# Patient Record
Sex: FEMALE | Race: WHITE | NOT HISPANIC OR LATINO
[De-identification: names, ages, dates, MRNs, and addresses within clinical notes are randomized per-mention and may not be internally consistent; named-entity substitution may affect disease eponyms.]

---

## 2020-02-28 ENCOUNTER — RX ONLY (RX ONLY)
Age: 45
End: 2020-02-28

## 2020-02-28 ENCOUNTER — ACNE/ROSACEA (OUTPATIENT)
Dept: URBAN - METROPOLITAN AREA CLINIC 36 | Facility: CLINIC | Age: 45
Setting detail: DERMATOLOGY
End: 2020-02-28

## 2020-02-28 DIAGNOSIS — Q82.5 CONGENITAL NON-NEOPLASTIC NEVUS: ICD-10-CM

## 2020-02-28 PROBLEM — L71.8 OTHER ROSACEA: Status: ACTIVE | Noted: 2020-02-28

## 2020-02-28 PROBLEM — L71.8 OTHER ROSACEA: Status: RESOLVED | Noted: 2020-02-28

## 2020-02-28 PROCEDURE — 99202 OFFICE O/P NEW SF 15 MIN: CPT

## 2020-03-05 ENCOUNTER — RX ONLY (RX ONLY)
Age: 45
End: 2020-03-05

## 2020-03-05 RX ORDER — SODIUM SULFACETAMIDE AND SULFUR 80; 40 MG/ML; MG/ML
1 APPLICATION LOTION TOPICAL DAILY
Qty: 473 | Refills: 0
Start: 2020-03-05

## 2020-03-05 RX ORDER — AZELAIC ACID 0.15 G/G
1 APPLICATION GEL TOPICAL DAILY
Qty: 50 | Refills: 3
Start: 2020-03-05

## 2020-04-23 ENCOUNTER — TELEHEALTH (OUTPATIENT)
Dept: URBAN - METROPOLITAN AREA CLINIC 35 | Facility: CLINIC | Age: 45
Setting detail: DERMATOLOGY
End: 2020-04-23

## 2020-04-23 ENCOUNTER — RX ONLY (RX ONLY)
Age: 45
End: 2020-04-23

## 2020-04-23 DIAGNOSIS — D22.5 MELANOCYTIC NEVI OF TRUNK: ICD-10-CM

## 2020-04-23 DIAGNOSIS — L56.8 OTHER SPECIFIED ACUTE SKIN CHANGES DUE TO ULTRAVIOLET RADIATION: ICD-10-CM

## 2020-04-23 PROBLEM — L71.8 OTHER ROSACEA: Status: RESOLVED | Noted: 2020-04-23

## 2020-04-23 PROBLEM — L71.8 OTHER ROSACEA: Status: ACTIVE | Noted: 2020-04-23

## 2020-04-23 PROCEDURE — 99213 OFFICE O/P EST LOW 20 MIN: CPT

## 2020-04-23 RX ORDER — OXYMETAZOLINE HYDROCHLORIDE 1 G/100G
1 APPLICATION CREAM TOPICAL IN A.M.
Qty: 30 | Refills: 2
Start: 2020-04-23

## 2020-04-23 RX ORDER — DOXYCYCLINE HYCLATE 100 MG/1
1 CAPSULE CAPSULE, GELATIN COATED ORAL DAILY
Qty: 90 | Refills: 0
Start: 2020-04-23

## 2020-06-12 ENCOUNTER — VBEAM/KTP-MT (OUTPATIENT)
Dept: URBAN - METROPOLITAN AREA CLINIC 36 | Facility: CLINIC | Age: 45
Setting detail: DERMATOLOGY
End: 2020-06-12

## 2020-06-12 DIAGNOSIS — L70.0 ACNE VULGARIS: ICD-10-CM

## 2020-06-12 PROCEDURE — VBEAM VBEAM LASER TREATMENT: HCPCS

## 2020-07-17 ENCOUNTER — VBEAM/KTP-BH (OUTPATIENT)
Dept: URBAN - METROPOLITAN AREA CLINIC 31 | Facility: CLINIC | Age: 45
Setting detail: DERMATOLOGY
End: 2020-07-17

## 2020-07-17 DIAGNOSIS — L73.8 OTHER SPECIFIED FOLLICULAR DISORDERS: ICD-10-CM

## 2020-07-17 PROCEDURE — VBEAM VBEAM LASER TREATMENT: HCPCS

## 2020-08-21 ENCOUNTER — ACNE (OUTPATIENT)
Dept: URBAN - METROPOLITAN AREA CLINIC 33 | Facility: CLINIC | Age: 45
Setting detail: DERMATOLOGY
End: 2020-08-21

## 2020-08-21 ENCOUNTER — RX ONLY (RX ONLY)
Age: 45
End: 2020-08-21

## 2020-08-21 DIAGNOSIS — D48.5 NEOPLASM OF UNCERTAIN BEHAVIOR OF SKIN: ICD-10-CM

## 2020-08-21 PROBLEM — L71.8 OTHER ROSACEA: Status: ACTIVE | Noted: 2020-08-21

## 2020-08-21 PROBLEM — L71.8 OTHER ROSACEA: Status: RESOLVED | Noted: 2020-08-21

## 2020-08-21 PROCEDURE — 99213 OFFICE O/P EST LOW 20 MIN: CPT

## 2020-08-21 RX ORDER — SPIRONOLACTONE 50 MG/1
1 TABLET TABLET, FILM COATED ORAL DAILY
Qty: 90 | Refills: 0
Start: 2020-08-21

## 2020-08-21 RX ORDER — DOXYCYCLINE HYCLATE 100 MG/1
1 CAPSULE CAPSULE, GELATIN COATED ORAL DAILY
Qty: 60 | Refills: 0
Start: 2020-08-21

## 2020-09-15 ENCOUNTER — VBEAM/KTP-EC (OUTPATIENT)
Dept: URBAN - METROPOLITAN AREA CLINIC 32 | Facility: CLINIC | Age: 45
Setting detail: DERMATOLOGY
End: 2020-09-15

## 2020-09-15 DIAGNOSIS — L20.84 INTRINSIC (ALLERGIC) ECZEMA: ICD-10-CM

## 2020-09-15 PROCEDURE — OTHER VBEAM LASER TREATMENT - NO CHARGE: OTHER

## 2020-10-26 ENCOUNTER — RX ONLY (RX ONLY)
Age: 45
End: 2020-10-26

## 2020-10-26 ENCOUNTER — ACNE/ROSACEA (OUTPATIENT)
Dept: URBAN - METROPOLITAN AREA CLINIC 31 | Facility: CLINIC | Age: 45
Setting detail: DERMATOLOGY
End: 2020-10-26

## 2020-10-26 DIAGNOSIS — D22.5 MELANOCYTIC NEVI OF TRUNK: ICD-10-CM

## 2020-10-26 PROCEDURE — 99213 OFFICE O/P EST LOW 20 MIN: CPT

## 2020-10-26 RX ORDER — SPIRONOLACTONE 50 MG/1
1 TABLET TABLET, FILM COATED ORAL BID
Qty: 60 | Refills: 2
Start: 2020-10-26

## 2020-10-26 RX ORDER — DOXYCYCLINE HYCLATE 50 MG/1
1 CAPSULE CAPSULE ORAL DAILY
Qty: 30 | Refills: 2 | Status: DISCONTINUED
Start: 2020-10-26 | End: 2021-01-06

## 2020-10-30 ENCOUNTER — VBEAM/KTP-MT (OUTPATIENT)
Dept: URBAN - METROPOLITAN AREA CLINIC 36 | Facility: CLINIC | Age: 45
Setting detail: DERMATOLOGY
End: 2020-10-30

## 2020-10-30 DIAGNOSIS — L57.0 ACTINIC KERATOSIS: ICD-10-CM

## 2020-10-30 PROCEDURE — OTHER COSMETIC TREATMENT: OTHER

## 2020-12-08 ENCOUNTER — RX ONLY (RX ONLY)
Age: 45
End: 2020-12-08

## 2020-12-08 RX ORDER — SPIRONOLACTONE 50 MG/1
1 TABLET TABLET, FILM COATED ORAL BID
Qty: 180 | Refills: 0
Start: 2020-12-08

## 2021-01-06 ENCOUNTER — RX ONLY (RX ONLY)
Age: 46
End: 2021-01-06

## 2021-01-06 RX ORDER — DOXYCYCLINE HYCLATE 50 MG/1
1 CAPSULE CAPSULE ORAL DAILY
Qty: 30 | Refills: 2
Start: 2021-01-06

## 2021-03-12 ENCOUNTER — VBEAM/KTP-MT (OUTPATIENT)
Dept: URBAN - METROPOLITAN AREA CLINIC 36 | Facility: CLINIC | Age: 46
Setting detail: DERMATOLOGY
End: 2021-03-12

## 2021-03-12 DIAGNOSIS — L72.3 SEBACEOUS CYST: ICD-10-CM

## 2021-03-12 PROCEDURE — OTHER COSMETIC TREATMENT: OTHER

## 2021-06-09 ENCOUNTER — SEE NOTE (OUTPATIENT)
Dept: URBAN - METROPOLITAN AREA CLINIC 36 | Facility: CLINIC | Age: 46
Setting detail: DERMATOLOGY
End: 2021-06-09

## 2021-06-09 DIAGNOSIS — L23.9 ALLERGIC CONTACT DERMATITIS, UNSPECIFIED CAUSE: ICD-10-CM

## 2021-06-09 PROBLEM — L60.9 NAIL DISORDER, UNSPECIFIED: Status: RESOLVED | Noted: 2021-06-09

## 2021-06-09 PROBLEM — L60.9 NAIL DISORDER, UNSPECIFIED: Status: ACTIVE | Noted: 2021-06-09

## 2021-06-09 PROCEDURE — 99213 OFFICE O/P EST LOW 20 MIN: CPT

## 2021-09-01 ENCOUNTER — FOLLOW UP (OUTPATIENT)
Dept: URBAN - METROPOLITAN AREA CLINIC 36 | Facility: CLINIC | Age: 46
Setting detail: DERMATOLOGY
End: 2021-09-01

## 2021-09-01 ENCOUNTER — RX ONLY (RX ONLY)
Age: 46
End: 2021-09-01

## 2021-09-01 DIAGNOSIS — B07.8 OTHER VIRAL WARTS: ICD-10-CM

## 2021-09-01 PROBLEM — L71.8 OTHER ROSACEA: Status: RESOLVED | Noted: 2021-09-01

## 2021-09-01 PROBLEM — L71.8 OTHER ROSACEA: Status: ACTIVE | Noted: 2021-09-01

## 2021-09-01 PROCEDURE — 99214 OFFICE O/P EST MOD 30 MIN: CPT

## 2021-09-01 RX ORDER — DOXYCYCLINE 100 MG/1
1 TABLET TABLET, FILM COATED ORAL TWICE A DAY
Qty: 28 | Refills: 0
Start: 2021-09-01

## 2021-09-01 RX ORDER — DOXYCYCLINE 40 MG/1
1 CAPSULE CAPSULE ORAL ONCE A DAY
Qty: 30 | Refills: 11
Start: 2021-09-01

## 2022-01-21 ENCOUNTER — RX ONLY (RX ONLY)
Age: 47
End: 2022-01-21

## 2022-01-21 ENCOUNTER — VBEAM BRUISING-MT (OUTPATIENT)
Dept: URBAN - METROPOLITAN AREA CLINIC 36 | Facility: CLINIC | Age: 47
Setting detail: DERMATOLOGY
End: 2022-01-21

## 2022-01-21 DIAGNOSIS — L73.8 OTHER SPECIFIED FOLLICULAR DISORDERS: ICD-10-CM

## 2022-01-21 PROBLEM — L71.8 OTHER ROSACEA: Status: RESOLVED | Noted: 2022-01-21

## 2022-01-21 PROBLEM — L60.9 NAIL DISORDER, UNSPECIFIED: Status: RESOLVED | Noted: 2022-01-21

## 2022-01-21 PROBLEM — L71.8 OTHER ROSACEA: Status: ACTIVE | Noted: 2022-01-21

## 2022-01-21 PROBLEM — L60.9 NAIL DISORDER, UNSPECIFIED: Status: ACTIVE | Noted: 2022-01-21

## 2022-01-21 PROCEDURE — VBEAM VBEAM LASER TREATMENT: HCPCS

## 2022-01-21 PROCEDURE — 99214 OFFICE O/P EST MOD 30 MIN: CPT

## 2022-02-10 ENCOUNTER — CYST INJECTION (OUTPATIENT)
Dept: URBAN - METROPOLITAN AREA CLINIC 36 | Facility: CLINIC | Age: 47
Setting detail: DERMATOLOGY
End: 2022-02-10

## 2022-02-10 DIAGNOSIS — L57.0 ACTINIC KERATOSIS: ICD-10-CM

## 2022-02-10 PROCEDURE — 11900 INJECT SKIN LESIONS </W 7: CPT

## 2022-02-11 ENCOUNTER — SEE NOTE (OUTPATIENT)
Dept: URBAN - METROPOLITAN AREA CLINIC 36 | Facility: CLINIC | Age: 47
Setting detail: DERMATOLOGY
End: 2022-02-11

## 2022-02-11 ENCOUNTER — RX ONLY (RX ONLY)
Age: 47
End: 2022-02-11

## 2022-02-11 DIAGNOSIS — L57.0 ACTINIC KERATOSIS: ICD-10-CM

## 2022-02-11 PROBLEM — L71.8 OTHER ROSACEA: Status: RESOLVED | Noted: 2022-02-11

## 2022-02-11 PROBLEM — L71.8 OTHER ROSACEA: Status: ACTIVE | Noted: 2022-02-11

## 2022-02-11 PROCEDURE — 99214 OFFICE O/P EST MOD 30 MIN: CPT

## 2022-03-30 ENCOUNTER — VBEAM/KTP-BH (OUTPATIENT)
Dept: URBAN - METROPOLITAN AREA CLINIC 31 | Facility: CLINIC | Age: 47
Setting detail: DERMATOLOGY
End: 2022-03-30

## 2022-03-30 DIAGNOSIS — L82.0 INFLAMED SEBORRHEIC KERATOSIS: ICD-10-CM

## 2022-03-30 PROBLEM — L604 703.8: Status: ACTIVE | Noted: 2022-03-30

## 2022-03-30 PROBLEM — L602 703.8: Status: ACTIVE | Noted: 2022-03-30

## 2022-03-30 PROBLEM — L601 703.8: Status: ACTIVE | Noted: 2022-03-30

## 2022-03-30 PROBLEM — L608 703.8: Status: ACTIVE | Noted: 2022-03-30

## 2022-03-30 PROBLEM — L603 703.8: Status: ACTIVE | Noted: 2022-03-30

## 2022-03-30 PROCEDURE — 99213 OFFICE O/P EST LOW 20 MIN: CPT

## 2022-03-30 PROCEDURE — VBEAM VBEAM LASER TREATMENT: HCPCS

## 2022-05-31 ENCOUNTER — VBEAM/KTP-EC (OUTPATIENT)
Dept: URBAN - METROPOLITAN AREA CLINIC 32 | Facility: CLINIC | Age: 47
Setting detail: DERMATOLOGY
End: 2022-05-31

## 2022-05-31 DIAGNOSIS — R68.89 OTHER GENERAL SYMPTOMS AND SIGNS: ICD-10-CM

## 2022-05-31 PROBLEM — L60.9 NAIL DISORDER, UNSPECIFIED: Status: ACTIVE | Noted: 2022-05-31

## 2022-05-31 PROBLEM — L60.9 NAIL DISORDER, UNSPECIFIED: Status: RESOLVED | Noted: 2022-05-31

## 2022-05-31 PROCEDURE — VBEAM VBEAM LASER TREATMENT: HCPCS

## 2022-06-30 ENCOUNTER — RX ONLY (RX ONLY)
Age: 47
End: 2022-06-30

## 2022-06-30 RX ORDER — EFINACONAZOLE 100 MG/ML
LIBERALLY SOLUTION TOPICAL EVERY NIGHT
Qty: 4 | Refills: 11
Start: 2022-06-30

## 2022-07-06 ENCOUNTER — RX ONLY (RX ONLY)
Age: 47
End: 2022-07-06

## 2022-07-06 RX ORDER — TERBINAFINE HYDROCHLORIDE 250 MG/1
1 TABLET TABLET ORAL ONCE A DAY
Qty: 90 | Refills: 0
Start: 2022-07-06

## 2022-07-27 ENCOUNTER — NAIL CHANGES (OUTPATIENT)
Dept: URBAN - METROPOLITAN AREA CLINIC 31 | Facility: CLINIC | Age: 47
Setting detail: DERMATOLOGY
End: 2022-07-27

## 2022-07-27 DIAGNOSIS — L72.3 SEBACEOUS CYST: ICD-10-CM

## 2022-07-27 PROBLEM — B35.1 TINEA UNGUIUM: Status: RESOLVED | Noted: 2022-07-27

## 2022-07-27 PROBLEM — B35.1 TINEA UNGUIUM: Status: ACTIVE | Noted: 2022-07-27

## 2022-07-27 PROCEDURE — 99214 OFFICE O/P EST MOD 30 MIN: CPT

## 2022-08-15 ENCOUNTER — APPOINTMENT (OUTPATIENT)
Dept: URBAN - METROPOLITAN AREA CLINIC 206 | Age: 47
Setting detail: DERMATOLOGY
End: 2022-08-21

## 2022-08-15 DIAGNOSIS — Z79.899 OTHER LONG TERM (CURRENT) DRUG THERAPY: ICD-10-CM

## 2022-08-15 PROCEDURE — OTHER ORDER TESTS: OTHER

## 2022-08-15 PROCEDURE — OTHER HIGH RISK MEDICATION MONITORING: OTHER

## 2022-08-15 PROCEDURE — OTHER COUNSELING: OTHER

## 2022-08-15 NOTE — PROCEDURE: HIGH RISK MEDICATION MONITORING
Xelnamz Pregnancy And Lactation Text: This medication is Pregnancy Category D and is not considered safe during pregnancy.  The risk during breast feeding is also uncertain.

## 2022-08-15 NOTE — PROCEDURE: ORDER TESTS
Billing Type: Third-Party Bill
Expected Date Of Service: 08/15/2022
Performing Laboratory: [id number]
Bill For Surgical Tray: no

## 2022-08-30 ENCOUNTER — APPOINTMENT (OUTPATIENT)
Dept: URBAN - METROPOLITAN AREA CLINIC 206 | Age: 47
Setting detail: DERMATOLOGY
End: 2022-09-01

## 2022-08-30 DIAGNOSIS — L71.8 OTHER ROSACEA: ICD-10-CM

## 2022-08-30 PROBLEM — R68.89 OTHER GENERAL SYMPTOMS AND SIGNS: Status: ACTIVE | Noted: 2022-08-30

## 2022-08-30 PROCEDURE — OTHER OTHER: OTHER

## 2022-08-30 PROCEDURE — OTHER ORDER TESTS: OTHER

## 2022-08-30 PROCEDURE — OTHER COUNSELING: OTHER

## 2022-08-30 PROCEDURE — OTHER PATIENT SPECIFIC COUNSELING: OTHER

## 2022-08-30 PROCEDURE — OTHER PRESCRIPTION: OTHER

## 2022-08-30 PROCEDURE — 99214 OFFICE O/P EST MOD 30 MIN: CPT

## 2022-08-30 RX ORDER — DOXYCYCLINE HYCLATE 20 MG/1
TABLET, FILM COATED ORAL
Qty: 60 | Refills: 0 | Status: ERX | COMMUNITY
Start: 2022-08-30

## 2022-08-30 RX ORDER — IVERMECTIN 10 MG/G
CREAM TOPICAL
Qty: 45 | Refills: 3 | Status: ERX | COMMUNITY
Start: 2022-08-30

## 2022-08-30 ASSESSMENT — LOCATION SIMPLE DESCRIPTION DERM
LOCATION SIMPLE: LEFT CHEEK
LOCATION SIMPLE: RIGHT CHEEK

## 2022-08-30 ASSESSMENT — LOCATION ZONE DERM: LOCATION ZONE: FACE

## 2022-08-30 ASSESSMENT — LOCATION DETAILED DESCRIPTION DERM
LOCATION DETAILED: RIGHT INFERIOR CENTRAL MALAR CHEEK
LOCATION DETAILED: LEFT INFERIOR CENTRAL MALAR CHEEK

## 2022-08-30 NOTE — PROCEDURE: PATIENT SPECIFIC COUNSELING
Patient to wait to treat with vbeam until pustules resolved from current rosacea flare today.
Detail Level: Zone

## 2022-08-30 NOTE — PROCEDURE: OTHER
Render Risk Assessment In Note?: no
Other (Free Text): Lab req given for when patient completes lamisil. Reviewed with pt that both labs (baseline and 6 week) contained lfts which were wnl. No need to redraw until course complete then follow up with Monik FRANCIS as instructed.
Note Text (......Xxx Chief Complaint.): This diagnosis correlates with the
Detail Level: Zone

## 2022-10-05 ENCOUNTER — APPOINTMENT (OUTPATIENT)
Dept: URBAN - METROPOLITAN AREA CLINIC 206 | Age: 47
Setting detail: DERMATOLOGY
End: 2022-10-05

## 2022-10-05 DIAGNOSIS — Z41.9 ENCOUNTER FOR PROCEDURE FOR PURPOSES OTHER THAN REMEDYING HEALTH STATE, UNSPECIFIED: ICD-10-CM

## 2022-10-05 PROCEDURE — OTHER VBEAM PERFECTA LASER: OTHER

## 2022-10-05 ASSESSMENT — LOCATION ZONE DERM
LOCATION ZONE: FACE
LOCATION ZONE: NOSE

## 2022-10-05 ASSESSMENT — LOCATION SIMPLE DESCRIPTION DERM
LOCATION SIMPLE: LEFT CHEEK
LOCATION SIMPLE: RIGHT CHEEK
LOCATION SIMPLE: CHIN
LOCATION SIMPLE: NOSE

## 2022-10-05 ASSESSMENT — LOCATION DETAILED DESCRIPTION DERM
LOCATION DETAILED: NASAL SUPRATIP
LOCATION DETAILED: LEFT INFERIOR CENTRAL MALAR CHEEK
LOCATION DETAILED: RIGHT INFERIOR CENTRAL MALAR CHEEK
LOCATION DETAILED: RIGHT CHIN

## 2022-10-05 NOTE — PROCEDURE: VBEAM PERFECTA LASER
Spray Time (Ms): 0
Post-Procedure: Following the procedure the post care instructions were reviewed with the patient.
Pulse Duration: 10 ms
Is There A Third Setting?: no
Spot Size: 3 mm
Number Of Pulses: 157
Consent: Written consent obtained.  Risks were reviewed including but not limited to crusting, scabbing, blistering, scarring, darker or lighter pigmentary change, bruising, and/or incomplete response.
Detail Level: Simple
Price (Use Numbers Only, No Special Characters Or $): 150
Post-Care Instructions: • Use gentle cleansers such as Cetphil or Cerave for 10 days after treatment\\n• If crusting occurs, do not shave or pick area. Apply Aquaphor ointment to wound area 2-3 times a day. Keep the area moist, and let the scab fall off on its own. No swimming or using hot tubs/whirlpools until the wound heals.\\n• Do not pick – this may cause infection and/or scarring\\n• When treatment area is exposed to the sun use a 30+ SPF sunblock with zinc oxide and reapply every 2 hours for first 2-3 weeks. \\n• We recommend daily SPF 30+ and wide-brimmed hat for 3-6 months\\n• You may resume products such as tretinoin/Retin-A, Retinol, glycolic acids, and alpha hydroxyl acid products 1 week after your last treatment\\n• You may resume Hydroquinone or bleaching products 2 weeks after your treatment\\n• Contact physician if there is any indication of blistering or infection (redness, tenderness or pus)\\n• Can expect redness & swelling for 24hrs; You may use ice packs if needed \\n• You may experience bruising for 5 – 10 days\\n• May use OTC Allegra to minimize swelling
Treatment Number: 9
Fluence (J/Cm2): 6.5
Spot Size: 10 mm
Pre-Procedure: The treatment areas identified by the patient were cleansed and treatment was performed with the parameters mentioned above. Proper eye protection was worn during procedure. Patient aware that multiple treatment may be needed for complete clearance.

## 2022-10-21 ENCOUNTER — RX ONLY (RX ONLY)
Age: 47
End: 2022-10-21

## 2022-10-21 RX ORDER — DOXYCYCLINE HYCLATE 20 MG/1
TABLET, FILM COATED ORAL
Qty: 60 | Refills: 0 | Status: ERX

## 2022-11-29 ENCOUNTER — RX ONLY (RX ONLY)
Age: 47
End: 2022-11-29

## 2022-11-29 RX ORDER — DOXYCYCLINE HYCLATE 20 MG/1
TABLET, FILM COATED ORAL
Qty: 180 | Refills: 1 | Status: ERX

## 2023-02-07 ENCOUNTER — APPOINTMENT (OUTPATIENT)
Dept: URBAN - METROPOLITAN AREA CLINIC 208 | Age: 48
Setting detail: DERMATOLOGY
End: 2023-02-09

## 2023-02-07 DIAGNOSIS — L71.8 OTHER ROSACEA: ICD-10-CM

## 2023-02-07 DIAGNOSIS — B35.1 TINEA UNGUIUM: ICD-10-CM

## 2023-02-07 DIAGNOSIS — L82.1 OTHER SEBORRHEIC KERATOSIS: ICD-10-CM

## 2023-02-07 PROCEDURE — OTHER COUNSELING: OTHER

## 2023-02-07 PROCEDURE — 99214 OFFICE O/P EST MOD 30 MIN: CPT

## 2023-02-07 PROCEDURE — OTHER PRESCRIPTION: OTHER

## 2023-02-07 PROCEDURE — OTHER PRESCRIPTION MEDICATION MANAGEMENT: OTHER

## 2023-02-07 PROCEDURE — OTHER OTHER: OTHER

## 2023-02-07 RX ORDER — DOXYCYCLINE HYCLATE 20 MG/1
TABLET, FILM COATED ORAL
Qty: 180 | Refills: 3 | Status: ERX

## 2023-02-07 RX ORDER — DOXYCYCLINE HYCLATE 100 MG/1
TABLET, COATED ORAL QD
Qty: 30 | Refills: 2 | Status: ERX | COMMUNITY
Start: 2023-02-07

## 2023-02-07 ASSESSMENT — LOCATION SIMPLE DESCRIPTION DERM
LOCATION SIMPLE: LOWER BACK
LOCATION SIMPLE: LEFT UPPER BACK
LOCATION SIMPLE: LEFT CHEEK
LOCATION SIMPLE: RIGHT CHEEK
LOCATION SIMPLE: LEFT GREAT TOE
LOCATION SIMPLE: NOSE

## 2023-02-07 ASSESSMENT — LOCATION DETAILED DESCRIPTION DERM
LOCATION DETAILED: LEFT GREAT TOENAIL
LOCATION DETAILED: LEFT CENTRAL MALAR CHEEK
LOCATION DETAILED: RIGHT CENTRAL MALAR CHEEK
LOCATION DETAILED: NASAL SUPRATIP
LOCATION DETAILED: LEFT SUPERIOR MEDIAL UPPER BACK
LOCATION DETAILED: SUPERIOR LUMBAR SPINE

## 2023-02-07 ASSESSMENT — LOCATION ZONE DERM
LOCATION ZONE: FACE
LOCATION ZONE: TOENAIL
LOCATION ZONE: NOSE
LOCATION ZONE: TRUNK

## 2023-02-07 NOTE — PROCEDURE: OTHER
Note Text (......Xxx Chief Complaint.): This diagnosis correlates with the
Render Risk Assessment In Note?: no
Detail Level: Zone
Other (Free Text): Continue to let nail grow out. \\nadvised patient there might be nail dystrophy when nail grows out.

## 2023-02-07 NOTE — PROCEDURE: PRESCRIPTION MEDICATION MANAGEMENT
Continue Regimen: Metronidazole on face in the morning\\nAzaleic acid on face at night
Render In Strict Bullet Format?: Yes
Modify Regimen: Restart Doxycycline 100 once a day 2-4 weeks then decrease to Doxy 20mg twice a day. If bothersome, switch to Minocycline foam daily.
Detail Level: Simple

## 2023-02-07 NOTE — HPI: INFECTION (ONYCHOMYCOSIS)
How Severe Is It?: mild
Is This A New Presentation, Or A Follow-Up?: Follow Up Onychomycosis
What Type Of Note Output Would You Prefer (Optional)?: Bullet Format

## 2023-03-15 NOTE — PROCEDURE: HIGH RISK MEDICATION MONITORING
Topical Clindamycin Pregnancy And Lactation Text: This medication is Pregnancy Category B and is considered safe during pregnancy. It is unknown if it is excreted in breast milk. Otezla Pregnancy And Lactation Text: This medication is Pregnancy Category C and it isn't known if it is safe during pregnancy. It is unknown if it is excreted in breast milk.

## 2023-04-19 ENCOUNTER — APPOINTMENT (OUTPATIENT)
Dept: URBAN - METROPOLITAN AREA CLINIC 206 | Age: 48
Setting detail: DERMATOLOGY
End: 2023-04-21

## 2023-04-19 DIAGNOSIS — Z41.9 ENCOUNTER FOR PROCEDURE FOR PURPOSES OTHER THAN REMEDYING HEALTH STATE, UNSPECIFIED: ICD-10-CM

## 2023-04-19 PROCEDURE — OTHER VBEAM PERFECTA LASER: OTHER

## 2023-04-19 ASSESSMENT — LOCATION DETAILED DESCRIPTION DERM
LOCATION DETAILED: LEFT INFERIOR CENTRAL MALAR CHEEK
LOCATION DETAILED: RIGHT CHIN
LOCATION DETAILED: RIGHT INFERIOR CENTRAL MALAR CHEEK
LOCATION DETAILED: NASAL SUPRATIP

## 2023-04-19 ASSESSMENT — LOCATION ZONE DERM
LOCATION ZONE: FACE
LOCATION ZONE: NOSE

## 2023-04-19 ASSESSMENT — LOCATION SIMPLE DESCRIPTION DERM
LOCATION SIMPLE: LEFT CHEEK
LOCATION SIMPLE: NOSE
LOCATION SIMPLE: CHIN
LOCATION SIMPLE: RIGHT CHEEK

## 2023-04-19 NOTE — HPI: COSMETIC (LASER RED SPOTS)
Have You Had Red Spots Treated With Laser Before?: has had previous treatments
Number Of Treatments: 9
When Was Your Last Laser Treatment?: 10/05/2023

## 2023-04-19 NOTE — PROCEDURE: VBEAM PERFECTA LASER
Spray Time (Ms): 0
Post-Procedure: Following the procedure the post care instructions were reviewed with the patient.
Pulse Duration: 10 ms
Is There A Third Setting?: no
Spot Size: 3 mm
Number Of Pulses: 224
Consent: Written consent obtained.  Risks were reviewed including but not limited to crusting, scabbing, blistering, scarring, darker or lighter pigmentary change, bruising, and/or incomplete response.
Detail Level: Simple
Price (Use Numbers Only, No Special Characters Or $): 150
Post-Care Instructions: - Use gentle cleansers such as Cetphil or Cerave for 10 days after treatment\\n- If crusting occurs, do not shave or pick area. Apply Aquaphor ointment to wound area 2-3 times a day. Keep the area moist, and let the scab fall off on its own. No swimming or using hot tubs/whirlpools until the wound heals.\\n- Do not pick ? this may cause infection and/or scarring\\n- When treatment area is exposed to the sun use a 30+ SPF sunblock with zinc oxide and reapply every 2 hours for first 2-3 weeks. \\n- We recommend daily SPF 30+ and wide-brimmed hat for 3-6 months\\n- You may resume products such as tretinoin/Retin-A, Retinol, glycolic acids, and alpha hydroxyl acid products 1 week after your last treatment\\n- You may resume Hydroquinone or bleaching products 2 weeks after your treatment\\n- Contact physician if there is any indication of blistering or infection (redness, tenderness or pus)\\n- Can expect redness & swelling for 24hrs; You may use ice packs if needed \\n- You may experience bruising for 5 ? 10 days\\n- May use OTC Allegra to minimize swelling
Treatment Number: 9
Fluence (J/Cm2): 6.5
Spot Size: 10 mm
Pre-Procedure: The treatment areas identified by the patient were cleansed and treatment was performed with the parameters mentioned above. Proper eye protection was worn during procedure. Patient aware that multiple treatment may be needed for complete clearance.

## 2023-10-04 ENCOUNTER — APPOINTMENT (OUTPATIENT)
Dept: URBAN - METROPOLITAN AREA CLINIC 206 | Age: 48
Setting detail: DERMATOLOGY
End: 2023-10-09

## 2023-10-04 DIAGNOSIS — Z41.9 ENCOUNTER FOR PROCEDURE FOR PURPOSES OTHER THAN REMEDYING HEALTH STATE, UNSPECIFIED: ICD-10-CM

## 2023-10-04 PROCEDURE — OTHER MIPS QUALITY: OTHER

## 2023-10-04 PROCEDURE — OTHER VBEAM PERFECTA LASER: OTHER

## 2023-10-04 ASSESSMENT — LOCATION SIMPLE DESCRIPTION DERM
LOCATION SIMPLE: NOSE
LOCATION SIMPLE: LEFT CHEEK
LOCATION SIMPLE: RIGHT CHEEK
LOCATION SIMPLE: CHIN

## 2023-10-04 ASSESSMENT — LOCATION ZONE DERM
LOCATION ZONE: NOSE
LOCATION ZONE: FACE

## 2023-10-04 ASSESSMENT — LOCATION DETAILED DESCRIPTION DERM
LOCATION DETAILED: RIGHT INFERIOR CENTRAL MALAR CHEEK
LOCATION DETAILED: NASAL SUPRATIP
LOCATION DETAILED: RIGHT CHIN
LOCATION DETAILED: LEFT INFERIOR CENTRAL MALAR CHEEK

## 2023-10-04 NOTE — PROCEDURE: VBEAM PERFECTA LASER
Spray Time (Ms): 0
Post-Procedure: Following the procedure the post care instructions were reviewed with the patient.
Pulse Duration: 10 ms
Is There A Third Setting?: no
Spot Size: 3 mm
Number Of Pulses: 255
Consent: Written consent obtained.  Risks were reviewed including but not limited to crusting, scabbing, blistering, scarring, darker or lighter pigmentary change, bruising, and/or incomplete response.
Detail Level: Simple
Price (Use Numbers Only, No Special Characters Or $): 150
Post-Care Instructions: - Use gentle cleansers such as Cetphil or Cerave for 10 days after treatment\\n- If crusting occurs, do not shave or pick area. Apply Aquaphor ointment to wound area 2-3 times a day. Keep the area moist, and let the scab fall off on its own. No swimming or using hot tubs/whirlpools until the wound heals.\\n- Do not pick ? this may cause infection and/or scarring\\n- When treatment area is exposed to the sun use a 30+ SPF sunblock with zinc oxide and reapply every 2 hours for first 2-3 weeks. \\n- We recommend daily SPF 30+ and wide-brimmed hat for 3-6 months\\n- You may resume products such as tretinoin/Retin-A, Retinol, glycolic acids, and alpha hydroxyl acid products 1 week after your last treatment\\n- You may resume Hydroquinone or bleaching products 2 weeks after your treatment\\n- Contact physician if there is any indication of blistering or infection (redness, tenderness or pus)\\n- Can expect redness & swelling for 24hrs; You may use ice packs if needed \\n- You may experience bruising for 5 ? 10 days\\n- May use OTC Allegra to minimize swelling
Treatment Number: 9
Fluence (J/Cm2): 6.5
Spot Size: 10 mm
Pre-Procedure: The treatment areas identified by the patient were cleansed and treatment was performed with the parameters mentioned above. Proper eye protection was worn during procedure. Patient aware that multiple treatment may be needed for complete clearance.

## 2024-01-24 ENCOUNTER — RX ONLY (RX ONLY)
Age: 49
End: 2024-01-24

## 2024-01-24 ENCOUNTER — APPOINTMENT (OUTPATIENT)
Dept: URBAN - METROPOLITAN AREA CLINIC 206 | Age: 49
Setting detail: DERMATOLOGY
End: 2024-01-26

## 2024-01-24 DIAGNOSIS — L72.0 EPIDERMAL CYST: ICD-10-CM

## 2024-01-24 DIAGNOSIS — L71.8 OTHER ROSACEA: ICD-10-CM

## 2024-01-24 DIAGNOSIS — L90.5 SCAR CONDITIONS AND FIBROSIS OF SKIN: ICD-10-CM

## 2024-01-24 PROCEDURE — OTHER ADDITIONAL NOTES: OTHER

## 2024-01-24 PROCEDURE — OTHER COUNSELING: OTHER

## 2024-01-24 PROCEDURE — OTHER MIPS QUALITY: OTHER

## 2024-01-24 PROCEDURE — OTHER EXTRACTIONS: OTHER

## 2024-01-24 PROCEDURE — 99214 OFFICE O/P EST MOD 30 MIN: CPT

## 2024-01-24 PROCEDURE — OTHER PRESCRIPTION: OTHER

## 2024-01-24 RX ORDER — DOXYCYCLINE HYCLATE 20 MG/1
TABLET, FILM COATED ORAL
Qty: 180 | Refills: 3 | Status: ERX

## 2024-01-24 RX ORDER — AZELAIC ACID 0.15 G/G
GEL TOPICAL QD
Qty: 50 | Refills: 6 | Status: ERX | COMMUNITY
Start: 2024-01-24

## 2024-01-24 RX ORDER — DOXYCYCLINE HYCLATE 100 MG/1
TABLET, COATED ORAL
Qty: 30 | Refills: 0 | Status: ERX

## 2024-01-24 ASSESSMENT — LOCATION DETAILED DESCRIPTION DERM
LOCATION DETAILED: RIGHT LATERAL SUPERIOR CHEST
LOCATION DETAILED: LEFT INFERIOR LATERAL MALAR CHEEK
LOCATION DETAILED: RIGHT INFERIOR CENTRAL MALAR CHEEK
LOCATION DETAILED: SUPERIOR MID FOREHEAD
LOCATION DETAILED: LEFT AREOLA

## 2024-01-24 ASSESSMENT — LOCATION SIMPLE DESCRIPTION DERM
LOCATION SIMPLE: SUPERIOR FOREHEAD
LOCATION SIMPLE: RIGHT CHEEK
LOCATION SIMPLE: CHEST
LOCATION SIMPLE: LEFT BREAST
LOCATION SIMPLE: LEFT CHEEK

## 2024-01-24 ASSESSMENT — LOCATION ZONE DERM
LOCATION ZONE: FACE
LOCATION ZONE: TRUNK

## 2024-01-24 NOTE — PROCEDURE: EXTRACTIONS
Prep Text (Optional): Prior to removal the treatment areas were prepped in the usual fashion.
Consent was obtained and risks were reviewed including but not limited to scarring, infection, bleeding, scabbing, incomplete removal, and allergy to anesthesia.
Acne Type: Comedonal Lesions
Post-Care Instructions: I reviewed with the patient in detail post-care instructions. Patient is to keep the treatment areas dry overnight, and then apply bacitracin twice daily until healed. Patient may apply hydrogen peroxide soaks to remove any crusting.
Detail Level: Detailed
Extraction Method: 11 blade and q-tip
Render Number Of Lesions Treated: no

## 2024-01-24 NOTE — PROCEDURE: ADDITIONAL NOTES
Detail Level: Simple
Render Risk Assessment In Note?: no
Additional Notes: Doxy 100 mg QD for 1 month, then 40mg daily
Additional Notes: continue using OTC silicone scar treatment \\nadvised patient she can switch to using ScarAway gel

## 2024-02-28 ENCOUNTER — APPOINTMENT (OUTPATIENT)
Dept: URBAN - METROPOLITAN AREA CLINIC 206 | Age: 49
Setting detail: DERMATOLOGY
End: 2024-03-01

## 2024-02-28 DIAGNOSIS — L71.8 OTHER ROSACEA: ICD-10-CM

## 2024-02-28 DIAGNOSIS — L72.8 OTHER FOLLICULAR CYSTS OF THE SKIN AND SUBCUTANEOUS TISSUE: ICD-10-CM

## 2024-02-28 PROCEDURE — OTHER PRESCRIPTION: OTHER

## 2024-02-28 PROCEDURE — OTHER PRESCRIPTION MEDICATION MANAGEMENT: OTHER

## 2024-02-28 PROCEDURE — 99214 OFFICE O/P EST MOD 30 MIN: CPT | Mod: 25

## 2024-02-28 PROCEDURE — OTHER INTRALESIONAL KENALOG: OTHER

## 2024-02-28 PROCEDURE — OTHER MIPS QUALITY: OTHER

## 2024-02-28 PROCEDURE — 11900 INJECT SKIN LESIONS </W 7: CPT

## 2024-02-28 PROCEDURE — OTHER COUNSELING: OTHER

## 2024-02-28 RX ORDER — DOXYCYCLINE HYCLATE 100 MG/1
TABLET, COATED ORAL
Qty: 60 | Refills: 0 | Status: ERX

## 2024-02-28 ASSESSMENT — LOCATION SIMPLE DESCRIPTION DERM
LOCATION SIMPLE: LEFT CHEEK
LOCATION SIMPLE: RIGHT CHEEK

## 2024-02-28 ASSESSMENT — LOCATION DETAILED DESCRIPTION DERM
LOCATION DETAILED: LEFT LATERAL MANDIBULAR CHEEK
LOCATION DETAILED: RIGHT CENTRAL MALAR CHEEK
LOCATION DETAILED: LEFT INFERIOR MEDIAL MALAR CHEEK
LOCATION DETAILED: LEFT MEDIAL MALAR CHEEK

## 2024-02-28 ASSESSMENT — LOCATION ZONE DERM: LOCATION ZONE: FACE

## 2024-02-28 NOTE — PROCEDURE: INTRALESIONAL KENALOG
How Many Mls Were Removed From The 40 Mg/Ml (10ml) Vial When Preparing The Injectable Solution?: 0
Bill For Wasted Drug (Kenalog)?: no
Kenalog Preparation: Kenalog
Validate Note Data When Using Inventory: Yes
Treatment Number (Optional): 1
Medical Necessity Clause: This procedure was medically necessary because the lesions that were treated were:
Total Volume (Ccs): 0.1
Detail Level: Zone
Administered By (Optional): Monik
Consent: The risks of atrophy were reviewed with the patient.
Kenalog Type Of Vial: Multiple Dose
Concentration Of Kenalog Solution Injected (Mg/Ml): 3.0
Show Inventory Tab: Hide

## 2024-02-28 NOTE — PROCEDURE: PRESCRIPTION MEDICATION MANAGEMENT
Continue Regimen: Doxycycline 100mg BID x 2 weeks and then decrease to doxycycline 100mg QD \\n\\nOnce rosacea has calmed down, plan on decreasing back down to doxycycline 40mg QD
Detail Level: Zone
Plan: patient has surgery in 2 weeks with oncologist, recommended patient check with physician to make sure they are ok with higher dose of doxycycline leading up to surgery.  oncologists are ok with doxycycline 100mg QD
Render In Strict Bullet Format?: Yes
Plan: Once rosacea has calmed down, plan on decreasing back down to doxycycline 40mg QD
Modify Regimen: Increase doxy 100mg to twice daily instead of once daily for two weeks then return to once a day
Continue Regimen: Azelaic Acid apply to face daily

## 2025-08-29 ENCOUNTER — APPOINTMENT (OUTPATIENT)
Dept: URBAN - METROPOLITAN AREA CLINIC 207 | Age: 50
Setting detail: DERMATOLOGY
End: 2025-09-04

## 2025-08-29 DIAGNOSIS — B35.1 TINEA UNGUIUM: ICD-10-CM

## 2025-08-29 DIAGNOSIS — D22 MELANOCYTIC NEVI: ICD-10-CM

## 2025-08-29 DIAGNOSIS — L72.0 EPIDERMAL CYST: ICD-10-CM

## 2025-08-29 DIAGNOSIS — L82.0 INFLAMED SEBORRHEIC KERATOSIS: ICD-10-CM

## 2025-08-29 PROBLEM — D22.61 MELANOCYTIC NEVI OF RIGHT UPPER LIMB, INCLUDING SHOULDER: Status: ACTIVE | Noted: 2025-08-29

## 2025-08-29 PROCEDURE — OTHER PRESCRIPTION: OTHER

## 2025-08-29 PROCEDURE — OTHER COUNSELING: OTHER

## 2025-08-29 PROCEDURE — OTHER EXTRACTIONS: OTHER

## 2025-08-29 PROCEDURE — 99213 OFFICE O/P EST LOW 20 MIN: CPT | Mod: 25

## 2025-08-29 PROCEDURE — 17110 DESTRUCT B9 LESION 1-14: CPT

## 2025-08-29 PROCEDURE — OTHER MIPS QUALITY: OTHER

## 2025-08-29 PROCEDURE — OTHER LIQUID NITROGEN: OTHER

## 2025-08-29 PROCEDURE — OTHER MEDICATION COUNSELING: OTHER

## 2025-08-29 PROCEDURE — OTHER ADDITIONAL NOTES: OTHER

## 2025-08-29 PROCEDURE — OTHER PRESCRIPTION MEDICATION MANAGEMENT: OTHER

## 2025-08-29 RX ORDER — FLUCONAZOLE 200 MG/1
TABLET ORAL
Qty: 32 | Refills: 0 | Status: ERX | COMMUNITY
Start: 2025-08-29

## 2025-08-29 ASSESSMENT — LOCATION SIMPLE DESCRIPTION DERM
LOCATION SIMPLE: LEFT THIGH
LOCATION SIMPLE: RIGHT POSTERIOR UPPER ARM
LOCATION SIMPLE: UPPER LIP
LOCATION SIMPLE: LEFT GREAT TOE

## 2025-08-29 ASSESSMENT — LOCATION DETAILED DESCRIPTION DERM
LOCATION DETAILED: RIGHT DISTAL POSTERIOR UPPER ARM
LOCATION DETAILED: PHILTRUM
LOCATION DETAILED: LEFT GREAT TOENAIL
LOCATION DETAILED: LEFT ANTERIOR PROXIMAL THIGH

## 2025-08-29 ASSESSMENT — LOCATION ZONE DERM
LOCATION ZONE: TOENAIL
LOCATION ZONE: LIP
LOCATION ZONE: LEG
LOCATION ZONE: ARM